# Patient Record
Sex: MALE | Race: WHITE | Employment: FULL TIME | URBAN - METROPOLITAN AREA
[De-identification: names, ages, dates, MRNs, and addresses within clinical notes are randomized per-mention and may not be internally consistent; named-entity substitution may affect disease eponyms.]

---

## 2018-08-28 ENCOUNTER — HOSPITAL ENCOUNTER (OUTPATIENT)
Age: 23
Discharge: HOME | End: 2018-08-28
Attending: ORTHOPAEDIC SURGERY
Payer: COMMERCIAL

## 2018-08-28 DIAGNOSIS — M54.12 CERVICAL RADICULOPATHY: ICD-10-CM

## 2018-08-28 DIAGNOSIS — M54.16 LUMBAR RADICULOPATHY: ICD-10-CM

## 2018-08-28 PROCEDURE — 72141 MRI NECK SPINE W/O DYE: CPT

## 2018-08-28 PROCEDURE — 72148 MRI LUMBAR SPINE W/O DYE: CPT

## 2023-12-14 ENCOUNTER — OFFICE VISIT (OUTPATIENT)
Age: 28
End: 2023-12-14
Payer: COMMERCIAL

## 2023-12-14 ENCOUNTER — PATIENT MESSAGE (OUTPATIENT)
Age: 28
End: 2023-12-14

## 2023-12-14 VITALS — BODY MASS INDEX: 31.8 KG/M2 | WEIGHT: 247.8 LBS | HEIGHT: 74 IN

## 2023-12-14 DIAGNOSIS — M54.50 ACUTE BILATERAL LOW BACK PAIN WITHOUT SCIATICA: Primary | ICD-10-CM

## 2023-12-14 DIAGNOSIS — M54.2 NECK PAIN: ICD-10-CM

## 2023-12-14 DIAGNOSIS — M54.6 ACUTE BILATERAL THORACIC BACK PAIN: ICD-10-CM

## 2023-12-14 PROCEDURE — 72100 X-RAY EXAM L-S SPINE 2/3 VWS: CPT | Performed by: PHYSICAL MEDICINE & REHABILITATION

## 2023-12-14 PROCEDURE — 72040 X-RAY EXAM NECK SPINE 2-3 VW: CPT | Performed by: PHYSICAL MEDICINE & REHABILITATION

## 2023-12-14 PROCEDURE — 99204 OFFICE O/P NEW MOD 45 MIN: CPT | Performed by: PHYSICAL MEDICINE & REHABILITATION

## 2023-12-14 PROCEDURE — 72070 X-RAY EXAM THORAC SPINE 2VWS: CPT | Performed by: PHYSICAL MEDICINE & REHABILITATION

## 2023-12-14 RX ORDER — ESCITALOPRAM OXALATE 10 MG/1
10 TABLET ORAL DAILY
COMMUNITY
Start: 2023-11-25

## 2023-12-14 RX ORDER — CYCLOBENZAPRINE HCL 5 MG
5 TABLET ORAL 2 TIMES DAILY PRN
Qty: 30 TABLET | Refills: 0 | Status: SHIPPED | OUTPATIENT
Start: 2023-12-14

## 2023-12-14 ASSESSMENT — PATIENT HEALTH QUESTIONNAIRE - PHQ9
SUM OF ALL RESPONSES TO PHQ QUESTIONS 1-9: 0
SUM OF ALL RESPONSES TO PHQ9 QUESTIONS 1 & 2: 0
SUM OF ALL RESPONSES TO PHQ QUESTIONS 1-9: 0
2. FEELING DOWN, DEPRESSED OR HOPELESS: 0
1. LITTLE INTEREST OR PLEASURE IN DOING THINGS: 0

## 2023-12-14 NOTE — PROGRESS NOTES
Exam     Inspection:   Alignment: Normal  Atrophy: None     Tenderness to Palpation:   Lumbar paraspinals Positive  + pain to percussion left flank  Lumbar spinous processes Negative  SI Joint:  Negative  Gluteal:Negative   Greater trochanter: Negative  IT Band:Negative    ROM:   Lumbar ROM: Abnormal pain with flexion  Lumbar facet loading: Negative  Hip ROM: No reproduction of pain with movement     Special Tests      Slump test: Positive- equivocal left  SLR: Negative  Tight hamstrings, hip flexors, quad  MICHA: Negative  FADIR: Negative  Log Roll: Negative           Medical Decision Making:    Images: The imaging results as well as the actual images of the studies below were reviewed, visualized and interpreted by me. Labs: The results below were reviewed. X-ray cervical spine 12/14/2023- straightening of cervical lordosis  X-ray thoracic spine 1214/2023- unremarkable  X-ray lumbar spine 12/14/2023- unremarkable      Assessment:   1. Acute bilateral low back pain without sciatica  -     AMB POC XRAY, SPINE, LUMBOSACRAL; 2 O  2. Acute bilateral thoracic back pain  -     AMB POC XRAY, SPINE; THORACIC, 2 VIEW  3. Neck pain  -     AMB POC XRAY, SPINE, CERVICAL; 2 OR 3       Plan:      -Physical therapy -  exercises provided -gentle knee to chest   -Medications - avoid NSAIDS VWF  -flexeril 5-10mg for muscle spasm  - discussed trying a steroid pack . Counseled regarding side effects and appropriate administration of medications.    -Diagnostics/Imaging - x-ray cervical , thoracic and lumbar spine- POC   -Injections - NA   -Lifestyle - Discussed activities to avoid  -note to hold from work x 1 week     -Encouraged him to follow up with PCP with flank pain- r/o kidney stone  -Education - The patient's diagnosis, prognosis and treatment options were discussed today. All questions were answered. F/U - in 1 week(s) or sooner if needed.   Consider medrol pack, consider MRI lumbar spine         Daksha

## 2023-12-15 RX ORDER — METHYLPREDNISOLONE 4 MG/1
TABLET ORAL
Qty: 1 KIT | Refills: 0 | Status: SHIPPED | OUTPATIENT
Start: 2023-12-15 | End: 2023-12-21

## 2023-12-15 NOTE — TELEPHONE ENCOUNTER
From: Rachael Snider. To: Dr. Eddie Cullen: 12/14/2023 6:00 PM EST  Subject: Kidney Results    Dr. Erica Sabillon,    I went to [facility] on [facility]. They did a urine sample and the results came back clear. They said my kidneys look good. They said they think it may be a muscular issue. Did you still want to send over the steroid to the pharmacy?     Thank you,    Martin Zhang  [phone]